# Patient Record
Sex: MALE | Race: WHITE | NOT HISPANIC OR LATINO | Employment: FULL TIME | URBAN - NONMETROPOLITAN AREA
[De-identification: names, ages, dates, MRNs, and addresses within clinical notes are randomized per-mention and may not be internally consistent; named-entity substitution may affect disease eponyms.]

---

## 2023-06-07 ENCOUNTER — HOSPITAL ENCOUNTER (EMERGENCY)
Facility: HOSPITAL | Age: 58
Discharge: HOME | End: 2023-06-07
Attending: STUDENT IN AN ORGANIZED HEALTH CARE EDUCATION/TRAINING PROGRAM | Admitting: STUDENT IN AN ORGANIZED HEALTH CARE EDUCATION/TRAINING PROGRAM
Payer: OTHER MISCELLANEOUS

## 2023-06-07 ENCOUNTER — APPOINTMENT (EMERGENCY)
Dept: RADIOLOGY | Facility: HOSPITAL | Age: 58
End: 2023-06-07
Payer: OTHER MISCELLANEOUS

## 2023-06-07 VITALS
DIASTOLIC BLOOD PRESSURE: 84 MMHG | RESPIRATION RATE: 18 BRPM | WEIGHT: 230 LBS | BODY MASS INDEX: 29.52 KG/M2 | SYSTOLIC BLOOD PRESSURE: 169 MMHG | HEART RATE: 82 BPM | HEIGHT: 74 IN | TEMPERATURE: 98.1 F | OXYGEN SATURATION: 95 %

## 2023-06-07 DIAGNOSIS — S62.665A CLOSED NONDISPLACED FRACTURE OF DISTAL PHALANX OF LEFT RING FINGER, INITIAL ENCOUNTER: Primary | ICD-10-CM

## 2023-06-07 DIAGNOSIS — S60.10XA SUBUNGUAL HEMATOMA OF FINGER, INITIAL ENCOUNTER: ICD-10-CM

## 2023-06-07 DIAGNOSIS — S61.215A LACERATION OF LEFT RING FINGER WITHOUT FOREIGN BODY WITHOUT DAMAGE TO NAIL, INITIAL ENCOUNTER: ICD-10-CM

## 2023-06-07 PROCEDURE — 73140 X-RAY EXAM OF FINGER(S): CPT

## 2023-06-07 PROCEDURE — 90715 TDAP VACCINE 7 YRS/> IM: CPT | Performed by: STUDENT IN AN ORGANIZED HEALTH CARE EDUCATION/TRAINING PROGRAM

## 2023-06-07 RX ORDER — CEPHALEXIN 500 MG/1
500 CAPSULE ORAL EVERY 6 HOURS SCHEDULED
Qty: 28 CAPSULE | Refills: 0 | Status: SHIPPED | OUTPATIENT
Start: 2023-06-07 | End: 2023-06-14

## 2023-06-07 RX ORDER — BUPIVACAINE HYDROCHLORIDE 2.5 MG/ML
10 INJECTION, SOLUTION EPIDURAL; INFILTRATION; INTRACAUDAL ONCE
Status: COMPLETED | OUTPATIENT
Start: 2023-06-07 | End: 2023-06-07

## 2023-06-07 RX ORDER — CEFAZOLIN SODIUM 2 G/50ML
2000 SOLUTION INTRAVENOUS ONCE
Status: COMPLETED | OUTPATIENT
Start: 2023-06-07 | End: 2023-06-07

## 2023-06-07 RX ORDER — GINSENG 100 MG
1 CAPSULE ORAL ONCE
Status: COMPLETED | OUTPATIENT
Start: 2023-06-07 | End: 2023-06-07

## 2023-06-07 RX ADMIN — CEFAZOLIN SODIUM 2000 MG: 2 SOLUTION INTRAVENOUS at 05:10

## 2023-06-07 RX ADMIN — TETANUS TOXOID, REDUCED DIPHTHERIA TOXOID AND ACELLULAR PERTUSSIS VACCINE, ADSORBED 0.5 ML: 5; 2.5; 8; 8; 2.5 SUSPENSION INTRAMUSCULAR at 04:38

## 2023-06-07 RX ADMIN — BACITRACIN 1 LARGE APPLICATION: 500 OINTMENT TOPICAL at 07:08

## 2023-06-07 RX ADMIN — BUPIVACAINE HYDROCHLORIDE 10 ML: 2.5 INJECTION, SOLUTION EPIDURAL; INFILTRATION; INTRACAUDAL; PERINEURAL at 05:18

## 2023-06-07 NOTE — DISCHARGE INSTRUCTIONS
You sustained a fracture to the tip of your left ring finger  The laceration was repaired with 14 sutures  Apply topical antibiotic ointment daily  You are also being prescribed a course of Keflex  Please take as directed  Motrin 600 mg every 6 hours and Tylenol 1000 mg every 6 hours recommended for pain  Outpatient referral to hand surgery was provided  Expect a phone call within the next few days to set up the appointment  Do not hesitate to be reevaluated in the ED for any concerning signs or symptoms

## 2023-06-07 NOTE — ED NOTES
Wound covered and splinted at this time  Pt verbalized understanding of wound care, and splint care, and return symptoms        Winsome Tucker RN  06/07/23 7902

## 2023-06-07 NOTE — ED PROVIDER NOTES
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       History provided by:  Patient  Finger Laceration  Location:  Finger  Finger laceration location:  L ring finger  Length:  4 5  Depth: Through muscle  Quality: avulsion    Bleeding: controlled    Time since incident:  1 hour  Laceration mechanism:  Metal edge  Pain details:     Quality:  Burning    Severity:  Moderate    Timing:  Constant    Progression:  Unchanged  Foreign body present:  No foreign bodies  Relieved by:  None tried  Worsened by: Movement and pressure  Ineffective treatments:  Certain positions  Tetanus status:  Out of date  Associated symptoms: swelling    Associated symptoms: no rash      77-year-old male  Sustained a laceration along the volar aspect of the left ring finger while at work this AM  Bleeding controlled  Was working with a piece of metal at the time of the injury  Unknown is tetanus booster is UTD  Does not take AC/AP medications  History reviewed  No pertinent past medical history  History reviewed  No pertinent surgical history  History reviewed  No pertinent family history  I have reviewed and agree with the history as documented  E-Cigarette/Vaping   • E-Cigarette Use Never User      E-Cigarette/Vaping Substances     Social History     Tobacco Use   • Smoking status: Never   • Smokeless tobacco: Never   Vaping Use   • Vaping Use: Never used   Substance Use Topics   • Alcohol use: Yes   • Drug use: Never     Review of Systems   Musculoskeletal: Positive for arthralgias and joint swelling  Skin: Positive for wound  Negative for color change, pallor and rash  Neurological: Negative for dizziness and light-headedness  Hematological: Does not bruise/bleed easily  All other systems reviewed and are negative  Physical Exam  Physical Exam  Vitals and nursing note reviewed  Constitutional:       General: He is not in acute distress  Appearance: He is not ill-appearing or toxic-appearing  HENT:      Head: Normocephalic and atraumatic  Right Ear: External ear normal       Left Ear: External ear normal    Eyes:      Extraocular Movements: Extraocular movements intact  Conjunctiva/sclera: Conjunctivae normal    Cardiovascular:      Rate and Rhythm: Normal rate and regular rhythm  Pulses: Normal pulses  Heart sounds: Normal heart sounds  No murmur heard  Pulmonary:      Effort: Pulmonary effort is normal  No respiratory distress  Breath sounds: Normal breath sounds  No stridor  No wheezing, rhonchi or rales  Chest:      Chest wall: No tenderness  Abdominal:      General: Bowel sounds are normal       Palpations: Abdomen is soft  Tenderness: There is no abdominal tenderness  There is no right CVA tenderness, left CVA tenderness, guarding or rebound  Musculoskeletal:         General: Swelling, tenderness and signs of injury present  Hands:       Comments: 4 5 cm semicircular avulsion/laceration along the volar aspect of the left ring finger  There is exposed muscle/adipose tissue  No obvious foreign bodies or bone exposure  Bleeding is controlled  The left ring finger is neurovascularly intact  Normal capillary refill  There is a small subungual hematoma  Flexor tendon is intact  Able to flex at the PIP and DIP joints  There is tenderness to palpation along the distal aspect of the ring finger  Both hands are dirty  Skin:     General: Skin is warm and dry  Capillary Refill: Capillary refill takes less than 2 seconds  Coloration: Skin is not jaundiced or pale  Findings: Bruising present  No erythema, lesion or rash  Neurological:      General: No focal deficit present  Mental Status: He is alert and oriented to person, place, and time  Mental status is at baseline        Cranial Nerves: No cranial nerve deficit  Sensory: No sensory deficit  Motor: No weakness  Psychiatric:         Mood and Affect: Mood normal          Behavior: Behavior normal          Thought Content: Thought content normal          Judgment: Judgment normal        Vital Signs  ED Triage Vitals [06/07/23 0400]   Temperature Pulse Respirations Blood Pressure SpO2   98 1 °F (36 7 °C) 82 16 (!) 160/110 98 %      Temp Source Heart Rate Source Patient Position - Orthostatic VS BP Location FiO2 (%)   Temporal Monitor Sitting Right arm --      Pain Score       2           Vitals:    06/07/23 0400 06/07/23 0710   BP: (!) 160/110 169/84   Pulse: 82 82   Patient Position - Orthostatic VS: Sitting Lying     ED Medications  Medications   tetanus-diphtheria-acellular pertussis (BOOSTRIX) IM injection 0 5 mL (0 5 mL Intramuscular Given 6/7/23 0438)   bupivacaine (PF) (MARCAINE) 0 25 % injection 10 mL (10 mL Infiltration Given by Other 6/7/23 0518)   ceFAZolin (ANCEF) IVPB (premix in dextrose) 2,000 mg 50 mL (0 mg Intravenous Stopped 6/7/23 0520)   bacitracin topical ointment 1 large application (1 large application Topical Given 6/7/23 0708)     Diagnostic Studies  Results Reviewed     None             XR finger fourth digit-ring LEFT   ED Interpretation by Carmelita Urbina DO (06/07 0502)   Left fourth distal phalanx fracture      Final Result by Augusto Kelly MD (06/07 1251)      Nondisplaced fracture in the distal phalanx of the ring finger  Soft tissue laceration and swelling  Workstation performed: [id number]               Procedures  Universal Protocol:  Consent: Verbal consent obtained  Consent given by: patient  Patient understanding: patient states understanding of the procedure being performed  Site marked: the operative site was marked  Laceration repair    Date/Time: 6/7/2023 5:40 AM    Performed by: Carmelita Urbina DO  Authorized by:  Carmelita Urbina DO  Body area: upper extremity  Location details: left ring finger  Laceration length: 4 5 cm  Contamination: The wound is contaminated  Tendon involvement: none  Nerve involvement: none  Vascular damage: no  Anesthesia: digital block (metacarpal block )    Anesthesia:  Local Anesthetic: bupivacaine 0 25% with epinephrine    Sedation:  Patient sedated: no        Procedure Details:  Preparation: Patient was prepped and draped in the usual sterile fashion  Irrigation solution: saline  Irrigation method: syringe  Amount of cleaning: extensive  Skin closure: Ethilon  Number of sutures: 14  Technique: simple  Approximation: close  Approximation difficulty: complex  Dressing: antibiotic ointment, 4x4 sterile gauze and splint  Patient tolerance: patient tolerated the procedure well with no immediate complications  Cleaning details: dirt and dust  Splint application    Date/Time: 6/7/2023 6:15 AM    Performed by: Clotilde Alvarez DO  Authorized by: Clotilde Alvarez DO  Universal Protocol:  Patient understanding: patient states understanding of the procedure being performed  Site marked: the operative site was marked    Pre-procedure details:     Sensation:  Normal    Skin color:  Pink   Procedure details:     Laterality:  Left    Location:  Finger    Finger:  L ring finger    Splint type:  Finger splint, static  Post-procedure details:     Pain:  Unchanged    Sensation:  Normal    Skin color:  Pink    Patient tolerance of procedure: Tolerated well, no immediate complications         ED Course     SBIRT 22yo+    Flowsheet Row Most Recent Value   Initial Alcohol Screen: US AUDIT-C     1  How often do you have a drink containing alcohol? 0 Filed at: 06/07/2023 0443   2  How many drinks containing alcohol do you have on a typical day you are drinking? 0 Filed at: 06/07/2023 0443   3a  Male UNDER 65: How often do you have five or more drinks on one occasion? 0 Filed at: 06/07/2023 0443   3b  FEMALE Any Age, or MALE 65+:  How often do you have 4 or more drinks on one occassion? 0 Filed at: 06/07/2023 0443   Audit-C Score 0 Filed at: 06/07/2023 7065   CASEY: How many times in the past year have you    Used an illegal drug or used a prescription medication for non-medical reasons? Never Filed at: 06/07/2023 0443        Medical Decision Making  The differential diagnoses include but are not limited to open fracture, subungal hematoma, flexor tendon injury  Vital signs reviewed  The left ring finger was NVI  Able to flex the finger at the DIP and PIP joints  Bleeding controlled  XR with signs of a distal phalanx fracture  No signs of exposed bone  Digital block performed at the bedside  Due to the amount of contamination noted on the hands, the left hand was soaked in betadine then copiously irrigated with saline  The laceration was repaired with sutures  See procedural notes above  IV Ancef administered given fracture and large open wound  A course of keflex was prescribed  OP Hand referral placed  Recommendations and return precautions discussed  All questions addressed  Stable for discharge  Closed nondisplaced fracture of distal phalanx of left ring finger, initial encounter: acute illness or injury  Laceration of left ring finger without foreign body without damage to nail, initial encounter: acute illness or injury  Subungual hematoma of finger, initial encounter: acute illness or injury  Amount and/or Complexity of Data Reviewed  Radiology: ordered and independent interpretation performed  Risk  OTC drugs  Prescription drug management            Disposition  Final diagnoses:   Closed nondisplaced fracture of distal phalanx of left ring finger, initial encounter   Laceration of left ring finger without foreign body without damage to nail, initial encounter   Subungual hematoma of finger, initial encounter     Time reflects when diagnosis was documented in both MDM as applicable and the Disposition within this note     Time User Action Codes Description Comment    6/7/2023 7:00 AM Varun Awkward Add [S20 025F] Closed displaced fracture of distal phalanx of left ring finger, initial encounter     6/7/2023  7:00 AM Varun Awkward Add [O56 525J] Laceration of left ring finger without foreign body without damage to nail, initial encounter     6/7/2023  7:00 AM Alexandria Luque Add [S60 10XA] Subungual hematoma of finger, initial encounter     6/7/2023  6:35 PM Varun Awkward Modify [I66 871G] Closed nondisplaced fracture of distal phalanx of left ring finger, initial encounter     6/7/2023  6:36 PM Varun Awkward Modify [A88 815B] Closed displaced fracture of distal phalanx of left ring finger, initial encounter     6/7/2023  6:37 PM Varun Awkward Modify [V01 887W] Closed nondisplaced fracture of distal phalanx of left ring finger, initial encounter       ED Disposition     ED Disposition   Discharge    Condition   Stable    Date/Time   Wed Jun 7, 2023  1790 [facility] discharge to home/self care                 Follow-up Information     Follow up With Specialties Details Why Contact Info    Ganesh Shanks MD Orthopedic Surgery, Hand Surgery In 3 days  [address] [phone]            Discharge Medication List as of 6/7/2023  7:04 AM      START taking these medications    Details   cephalexin (KEFLEX) 500 mg capsule Take 1 capsule (500 mg total) by mouth every 6 (six) hours for 7 days, Starting Wed 6/7/2023, Until Wed 6/14/2023, Normal                 PDMP Review     None          ED Provider  Electronically Signed by           Anabell Grey DO  06/07/23 3819

## 2023-06-07 NOTE — TELEPHONE ENCOUNTER
Patient is being referred to a orthopedics  Please schedule accordingly      [address]   [phone]